# Patient Record
Sex: FEMALE | Race: WHITE | NOT HISPANIC OR LATINO | Employment: OTHER | URBAN - NONMETROPOLITAN AREA
[De-identification: names, ages, dates, MRNs, and addresses within clinical notes are randomized per-mention and may not be internally consistent; named-entity substitution may affect disease eponyms.]

---

## 2023-03-21 PROBLEM — I10 HYPERTENSION: Status: ACTIVE | Noted: 2023-03-21

## 2023-03-21 PROBLEM — E87.6 HYPOKALEMIA: Status: ACTIVE | Noted: 2023-03-21

## 2023-03-21 PROBLEM — G89.29 CHRONIC PAIN: Status: ACTIVE | Noted: 2023-03-21

## 2023-03-21 PROBLEM — N95.2 POSTMENOPAUSAL ATROPHIC VAGINITIS: Status: ACTIVE | Noted: 2023-03-21

## 2023-03-21 PROBLEM — R06.02 CHRONIC SHORTNESS OF BREATH: Status: ACTIVE | Noted: 2023-03-21

## 2023-03-21 PROBLEM — H25.11 AGE-RELATED NUCLEAR CATARACT OF RIGHT EYE: Status: ACTIVE | Noted: 2023-03-21

## 2023-03-21 PROBLEM — M19.90 OSTEOARTHRITIS: Status: ACTIVE | Noted: 2023-03-21

## 2023-03-21 PROBLEM — M81.0 OSTEOPOROSIS: Status: ACTIVE | Noted: 2023-03-21

## 2023-03-21 PROBLEM — E61.1 IRON DEFICIENCY: Status: ACTIVE | Noted: 2023-03-21

## 2023-03-21 PROBLEM — H25.12 AGE-RELATED NUCLEAR CATARACT OF LEFT EYE: Status: ACTIVE | Noted: 2023-03-21

## 2023-03-21 RX ORDER — GUAIFENESIN AND PHENYLEPHRINE HCL 400; 10 MG/1; MG/1
1 TABLET ORAL DAILY
COMMUNITY
Start: 2020-06-19

## 2023-03-21 RX ORDER — BETAMETHASONE VALERATE 1 MG/G
CREAM TOPICAL
COMMUNITY
Start: 2021-10-06 | End: 2023-07-24 | Stop reason: ALTCHOICE

## 2023-03-21 RX ORDER — COD LIVER OIL
1 OIL (ML) ORAL DAILY
COMMUNITY
Start: 2020-06-19 | End: 2023-10-18

## 2023-03-21 RX ORDER — CHOLECALCIFEROL (VITAMIN D3) 25 MCG
1 TABLET ORAL DAILY
COMMUNITY
Start: 2020-06-19 | End: 2024-06-03 | Stop reason: WASHOUT

## 2023-03-21 RX ORDER — NICOTINE POLACRILEX 2 MG
1 GUM BUCCAL DAILY
COMMUNITY
Start: 2020-06-19 | End: 2024-06-03 | Stop reason: WASHOUT

## 2023-03-21 RX ORDER — ASCORBIC ACID 300 MG
1 TABLET,CHEWABLE ORAL DAILY
COMMUNITY
Start: 2020-06-19

## 2023-03-21 RX ORDER — POTASSIUM CHLORIDE 20 MEQ/1
1 TABLET, EXTENDED RELEASE ORAL
COMMUNITY
Start: 2020-02-19

## 2023-03-21 RX ORDER — GINGER ROOT/GINGER ROOT EXT 262.5 MG
1 CAPSULE ORAL DAILY
COMMUNITY
Start: 2020-06-19

## 2023-03-21 RX ORDER — TRIAMCINOLONE ACETONIDE 1 MG/ML
LOTION TOPICAL
COMMUNITY
Start: 2021-05-03 | End: 2023-07-24 | Stop reason: ALTCHOICE

## 2023-03-21 RX ORDER — ASPIRIN 325 MG
1 TABLET ORAL DAILY
COMMUNITY

## 2023-03-21 RX ORDER — DENOSUMAB 60 MG/ML
60 INJECTION SUBCUTANEOUS
COMMUNITY
Start: 2021-07-21 | End: 2024-06-03 | Stop reason: SDUPTHER

## 2023-03-21 RX ORDER — BUDESONIDE AND FORMOTEROL FUMARATE DIHYDRATE 160; 4.5 UG/1; UG/1
2 AEROSOL RESPIRATORY (INHALATION) 2 TIMES DAILY
COMMUNITY
Start: 2019-10-07 | End: 2023-07-03

## 2023-03-21 RX ORDER — GLUCOSAMINE/CHONDR SU A SOD 167-133 MG
1 CAPSULE ORAL DAILY
COMMUNITY

## 2023-03-21 RX ORDER — FUROSEMIDE 40 MG/1
1 TABLET ORAL
COMMUNITY
Start: 2020-05-27

## 2023-03-21 RX ORDER — MULTIVITAMIN
TABLET ORAL
COMMUNITY

## 2023-04-03 ENCOUNTER — OFFICE VISIT (OUTPATIENT)
Dept: PRIMARY CARE | Facility: CLINIC | Age: 88
End: 2023-04-03
Payer: MEDICARE

## 2023-04-03 VITALS
DIASTOLIC BLOOD PRESSURE: 70 MMHG | BODY MASS INDEX: 27.76 KG/M2 | TEMPERATURE: 98 F | OXYGEN SATURATION: 98 % | WEIGHT: 128.3 LBS | SYSTOLIC BLOOD PRESSURE: 118 MMHG | HEART RATE: 53 BPM

## 2023-04-03 DIAGNOSIS — M25.511 ACUTE PAIN OF RIGHT SHOULDER: ICD-10-CM

## 2023-04-03 DIAGNOSIS — R06.02 CHRONIC SHORTNESS OF BREATH: Primary | ICD-10-CM

## 2023-04-03 DIAGNOSIS — M79.18 MUSCULOSKELETAL PAIN: ICD-10-CM

## 2023-04-03 PROCEDURE — 3074F SYST BP LT 130 MM HG: CPT

## 2023-04-03 PROCEDURE — 3078F DIAST BP <80 MM HG: CPT

## 2023-04-03 PROCEDURE — 99214 OFFICE O/P EST MOD 30 MIN: CPT

## 2023-04-03 PROCEDURE — 1036F TOBACCO NON-USER: CPT

## 2023-04-03 RX ORDER — DICLOFENAC SODIUM 10 MG/G
4 GEL TOPICAL 4 TIMES DAILY PRN
Qty: 50 G | Refills: 2 | Status: SHIPPED | OUTPATIENT
Start: 2023-04-03

## 2023-04-03 ASSESSMENT — ENCOUNTER SYMPTOMS
ALLERGIC/IMMUNOLOGIC NEGATIVE: 1
DEPRESSION: 0
FATIGUE: 0
WEAKNESS: 0
OCCASIONAL FEELINGS OF UNSTEADINESS: 0
MUSCULOSKELETAL NEGATIVE: 1
LOSS OF SENSATION IN FEET: 0
SHORTNESS OF BREATH: 0
HEADACHES: 0
FEVER: 0
CARDIOVASCULAR NEGATIVE: 1
PSYCHIATRIC NEGATIVE: 1
RESPIRATORY NEGATIVE: 1
UNEXPECTED WEIGHT CHANGE: 0
DIZZINESS: 0
ENDOCRINE NEGATIVE: 1
ACTIVITY CHANGE: 0
ABDOMINAL PAIN: 0
GASTROINTESTINAL NEGATIVE: 1

## 2023-04-03 ASSESSMENT — SOCIAL DETERMINANTS OF HEALTH (SDOH)
WITHIN THE LAST YEAR, HAVE YOU BEEN KICKED, HIT, SLAPPED, OR OTHERWISE PHYSICALLY HURT BY YOUR PARTNER OR EX-PARTNER?: NO
WITHIN THE LAST YEAR, HAVE YOU BEEN AFRAID OF YOUR PARTNER OR EX-PARTNER?: NO
WITHIN THE LAST YEAR, HAVE TO BEEN RAPED OR FORCED TO HAVE ANY KIND OF SEXUAL ACTIVITY BY YOUR PARTNER OR EX-PARTNER?: NO
WITHIN THE LAST YEAR, HAVE YOU BEEN HUMILIATED OR EMOTIONALLY ABUSED IN OTHER WAYS BY YOUR PARTNER OR EX-PARTNER?: NO

## 2023-04-03 ASSESSMENT — PATIENT HEALTH QUESTIONNAIRE - PHQ9
1. LITTLE INTEREST OR PLEASURE IN DOING THINGS: NOT AT ALL
2. FEELING DOWN, DEPRESSED OR HOPELESS: NOT AT ALL
SUM OF ALL RESPONSES TO PHQ9 QUESTIONS 1 AND 2: 0

## 2023-04-03 NOTE — PATIENT INSTRUCTIONS
Start O2 at night 1-2 liters  Continue healthy diet and active lifestyle    Thank you for coming in today, if any questions or concerns arise, please call my office.     Alex Porras, APRN-CNP

## 2023-04-03 NOTE — PROGRESS NOTES
Subjective   Patient ID: Marilin Khan is a 89 y.o. female who presents for Shoulder Pain (Marilin is here for having a sore right shoulder. ) and oxygen (Would like to discuss getting oxygen at night. States he doesn't breathe very well when she is laying down. ).  Patient presents with complaints of trouble breathing while asleep,   Trouble with taking deep breaths at night.   Usually wakes up mid-sleep, 9p-7am  Naps once per day in the afternoon    Recently fell out of bed about 5 years ago.   Sleeping on the trendle bed.     She has been on Oxygen in the past, had pneumonia, oxygen was used for a number of months during the night, which helped her sleep.   Leg cramps wake her up at night    Right shoulder pain, recent over the past few days  No injury to this  Pain with active and passive ROM    Goes to the [facility] for swimming exercises  Desaturated to 88% with walking on Room air        Vitals:    04/03/23 1434   BP: 118/70   Pulse: 53   Temp: 36.7 °C (98 °F)   SpO2: 98%       Review of Systems   Constitutional:  Negative for activity change, fatigue, fever and unexpected weight change.   HENT: Negative.     Respiratory: Negative.  Negative for shortness of breath.    Cardiovascular: Negative.  Negative for chest pain.   Gastrointestinal: Negative.  Negative for abdominal pain.   Endocrine: Negative.    Musculoskeletal: Negative.    Skin: Negative.    Allergic/Immunologic: Negative.    Neurological:  Negative for dizziness, weakness and headaches.   Psychiatric/Behavioral: Negative.         Objective   Physical Exam  Constitutional:       Appearance: Normal appearance.   HENT:      Head: Normocephalic.      Mouth/Throat:      Mouth: Mucous membranes are moist.   Cardiovascular:      Rate and Rhythm: Normal rate and regular rhythm.      Pulses: Normal pulses.      Heart sounds: Normal heart sounds. No murmur heard.     No friction rub. No gallop.   Pulmonary:      Effort: Pulmonary effort is normal. No respiratory  distress.      Breath sounds: Normal breath sounds. No wheezing.   Abdominal:      General: Bowel sounds are normal. There is no distension.      Palpations: Abdomen is soft.      Tenderness: There is no abdominal tenderness.   Musculoskeletal:         General: No deformity.      Right shoulder: Tenderness and bony tenderness present. No swelling or deformity. Decreased range of motion. Normal strength. Abnormal pulses: voltaren.      Left shoulder: Normal.        Arms:       Comments: AC joint pain on palpation   Skin:     General: Skin is warm and dry.      Capillary Refill: Capillary refill takes less than 2 seconds.   Neurological:      General: No focal deficit present.      Mental Status: She is alert and oriented to person, place, and time.   Psychiatric:         Mood and Affect: Mood normal.         Assessment/Plan   Problem List Items Addressed This Visit          Respiratory    Chronic shortness of breath - Primary     Other Visit Diagnoses       Musculoskeletal pain        Acute pain of right shoulder                Time Spent  Prep time on day of patient encounter: 10 minutes  Time spent directly with patient, family or caregiver: 25 minutes  Additional Time Spent on Patient Care Activities: 5 minutes  Documentation Time: 5 minutes  Other Time Spent: 0 minutes  Total: 45 minutes        Thank you for coming in today, please call my office if you have any concerns or questions.     Alex RADFORD, CNP

## 2023-04-05 ENCOUNTER — TELEPHONE (OUTPATIENT)
Dept: PRIMARY CARE | Facility: CLINIC | Age: 88
End: 2023-04-05
Payer: MEDICARE

## 2023-04-05 NOTE — TELEPHONE ENCOUNTER
Davis from [facility] [employer] called stating that they received the request for home oxygen for patient but are unable to process through her insurance because she needs to have an overnight oximetry test done. They state that they would be able to set her up for this test if you are able to order.

## 2023-04-06 NOTE — TELEPHONE ENCOUNTER
Completed form for [employer] to get overnight oximetry ordered. Faxed form to [facility].

## 2023-06-14 ENCOUNTER — OFFICE VISIT (OUTPATIENT)
Dept: PRIMARY CARE | Facility: CLINIC | Age: 88
End: 2023-06-14
Payer: MEDICARE

## 2023-06-14 VITALS
OXYGEN SATURATION: 91 % | HEART RATE: 94 BPM | SYSTOLIC BLOOD PRESSURE: 120 MMHG | BODY MASS INDEX: 26.66 KG/M2 | TEMPERATURE: 97 F | DIASTOLIC BLOOD PRESSURE: 70 MMHG | WEIGHT: 123.2 LBS

## 2023-06-14 DIAGNOSIS — R09.02 HYPOXIA: ICD-10-CM

## 2023-06-14 DIAGNOSIS — R06.02 CHRONIC SHORTNESS OF BREATH: Primary | ICD-10-CM

## 2023-06-14 PROCEDURE — 1036F TOBACCO NON-USER: CPT | Performed by: FAMILY MEDICINE

## 2023-06-14 PROCEDURE — 99214 OFFICE O/P EST MOD 30 MIN: CPT | Performed by: FAMILY MEDICINE

## 2023-06-14 PROCEDURE — 1159F MED LIST DOCD IN RCRD: CPT | Performed by: FAMILY MEDICINE

## 2023-06-14 PROCEDURE — 3074F SYST BP LT 130 MM HG: CPT | Performed by: FAMILY MEDICINE

## 2023-06-14 PROCEDURE — 3078F DIAST BP <80 MM HG: CPT | Performed by: FAMILY MEDICINE

## 2023-06-14 ASSESSMENT — ENCOUNTER SYMPTOMS
HEADACHES: 0
NAUSEA: 0
WEAKNESS: 0
ARTHRALGIAS: 0
BLOOD IN STOOL: 0
CONSTIPATION: 0
SLEEP DISTURBANCE: 0
EYE ITCHING: 0
SORE THROAT: 0
FEVER: 0
RHINORRHEA: 0
ACTIVITY CHANGE: 0
MYALGIAS: 0
EYE DISCHARGE: 0
NUMBNESS: 0
UNEXPECTED WEIGHT CHANGE: 0
ABDOMINAL PAIN: 0
VOMITING: 0
DYSURIA: 0
SINUS PRESSURE: 0
APPETITE CHANGE: 0
NERVOUS/ANXIOUS: 0
FLANK PAIN: 0
COUGH: 1
DIZZINESS: 0
HEMATURIA: 0
DIARRHEA: 0
PALPITATIONS: 0
LIGHT-HEADEDNESS: 0
DYSPHORIC MOOD: 0
SHORTNESS OF BREATH: 1
JOINT SWELLING: 0
WHEEZING: 0

## 2023-06-14 NOTE — PROGRESS NOTES
Subjective   Patient ID: Marilin Khan is a 89 y.o. female who presents for FACE TO FACE (Marilin is here for oxygen mainly at night. During the day if he walks a lot it takes her awhile to catch her breath. Walking down the hallway her oxygen started at 90 and went to 91. ).    HPI SHE IS HYPOXIC WITH EXERTION HERE AND REPORTS DYSPNEA AT NIGHT   She did this at one point and then refused and they took it back     Review of Systems   Constitutional:  Negative for activity change, appetite change, fever and unexpected weight change.   HENT:  Negative for congestion, ear pain, postnasal drip, rhinorrhea, sinus pressure and sore throat.    Eyes:  Negative for discharge, itching and visual disturbance.   Respiratory:  Positive for cough and shortness of breath. Negative for wheezing.    Cardiovascular:  Negative for chest pain, palpitations and leg swelling.   Gastrointestinal:  Negative for abdominal pain, blood in stool, constipation, diarrhea, nausea and vomiting.   Endocrine: Negative for cold intolerance, heat intolerance and polyuria.   Genitourinary:  Negative for dysuria, flank pain and hematuria.   Musculoskeletal:  Negative for arthralgias, gait problem, joint swelling and myalgias.   Skin:  Negative for rash.   Allergic/Immunologic: Negative for environmental allergies and food allergies.   Neurological:  Negative for dizziness, syncope, weakness, light-headedness, numbness and headaches.   Psychiatric/Behavioral:  Negative for dysphoric mood and sleep disturbance. The patient is not nervous/anxious.        Objective   /70   Pulse 94   Temp 36.1 °C (97 °F)   Wt 55.9 kg (123 lb 3.2 oz)   SpO2 91%   BMI 26.66 kg/m²     Physical Exam  Vitals and nursing note reviewed.   Constitutional:       Appearance: Normal appearance.   HENT:      Head: Normocephalic.      Mouth/Throat:      Mouth: Mucous membranes are moist.   Cardiovascular:      Rate and Rhythm: Normal rate and regular rhythm.      Pulses: Normal  pulses.      Heart sounds: Normal heart sounds. No murmur heard.     No friction rub. No gallop.   Pulmonary:      Effort: Pulmonary effort is normal. No respiratory distress.      Breath sounds: Normal breath sounds. No wheezing.   Abdominal:      General: Bowel sounds are normal. There is no distension.      Palpations: Abdomen is soft.      Tenderness: There is no abdominal tenderness.   Musculoskeletal:         General: No deformity. Normal range of motion.   Skin:     General: Skin is warm and dry.      Capillary Refill: Capillary refill takes less than 2 seconds.   Neurological:      General: No focal deficit present.      Mental Status: She is alert and oriented to person, place, and time.   Psychiatric:         Mood and Affect: Mood normal.         Assessment/Plan   Diagnoses and all orders for this visit:  Chronic shortness of breath  -     Referral to Pulmonology; Future  Hypoxia  -     Referral to Pulmonology; Future

## 2023-07-01 DIAGNOSIS — R06.02 CHRONIC SHORTNESS OF BREATH: Primary | ICD-10-CM

## 2023-07-03 RX ORDER — BUDESONIDE AND FORMOTEROL FUMARATE DIHYDRATE 160; 4.5 UG/1; UG/1
AEROSOL RESPIRATORY (INHALATION)
Qty: 10.2 G | Refills: 2 | Status: SHIPPED | OUTPATIENT
Start: 2023-07-03

## 2023-07-21 ENCOUNTER — APPOINTMENT (OUTPATIENT)
Dept: PRIMARY CARE | Facility: CLINIC | Age: 88
End: 2023-07-21
Payer: MEDICARE

## 2023-07-24 ENCOUNTER — OFFICE VISIT (OUTPATIENT)
Dept: PRIMARY CARE | Facility: CLINIC | Age: 88
End: 2023-07-24
Payer: MEDICARE

## 2023-07-24 VITALS
TEMPERATURE: 96.1 F | BODY MASS INDEX: 26.4 KG/M2 | HEART RATE: 68 BPM | WEIGHT: 122 LBS | DIASTOLIC BLOOD PRESSURE: 82 MMHG | SYSTOLIC BLOOD PRESSURE: 122 MMHG | OXYGEN SATURATION: 96 %

## 2023-07-24 DIAGNOSIS — L21.9 SEBORRHEIC DERMATITIS OF SCALP: Primary | ICD-10-CM

## 2023-07-24 DIAGNOSIS — M25.511 PAIN AND SWELLING OF RIGHT SHOULDER: ICD-10-CM

## 2023-07-24 DIAGNOSIS — M25.411 PAIN AND SWELLING OF RIGHT SHOULDER: ICD-10-CM

## 2023-07-24 PROCEDURE — 1159F MED LIST DOCD IN RCRD: CPT | Performed by: FAMILY MEDICINE

## 2023-07-24 PROCEDURE — 99214 OFFICE O/P EST MOD 30 MIN: CPT | Performed by: FAMILY MEDICINE

## 2023-07-24 PROCEDURE — 3079F DIAST BP 80-89 MM HG: CPT | Performed by: FAMILY MEDICINE

## 2023-07-24 PROCEDURE — 3074F SYST BP LT 130 MM HG: CPT | Performed by: FAMILY MEDICINE

## 2023-07-24 PROCEDURE — 1036F TOBACCO NON-USER: CPT | Performed by: FAMILY MEDICINE

## 2023-07-24 RX ORDER — CYANOCOBALAMIN (VITAMIN B-12) 250 MCG
1 TABLET ORAL DAILY
COMMUNITY

## 2023-07-24 RX ORDER — CALCIUM CARBONATE/VITAMIN D3 500 MG-10
TABLET,CHEWABLE ORAL
COMMUNITY

## 2023-07-24 RX ORDER — FERROUS SULFATE 325(65) MG
TABLET ORAL
COMMUNITY

## 2023-07-24 RX ORDER — AMLODIPINE BESYLATE 2.5 MG/1
TABLET ORAL
COMMUNITY
Start: 2019-03-19 | End: 2023-10-18

## 2023-07-24 RX ORDER — KETOCONAZOLE 20 MG/ML
SHAMPOO, SUSPENSION TOPICAL 2 TIMES WEEKLY
Qty: 120 ML | Refills: 0 | Status: SHIPPED | OUTPATIENT
Start: 2023-07-24 | End: 2023-11-30

## 2023-07-24 RX ORDER — ALBUTEROL SULFATE 90 UG/1
AEROSOL, METERED RESPIRATORY (INHALATION) 4 TIMES DAILY
COMMUNITY
Start: 2019-03-19 | End: 2024-06-03 | Stop reason: SDUPTHER

## 2023-07-24 ASSESSMENT — ENCOUNTER SYMPTOMS
RHINORRHEA: 0
VOMITING: 0
SHORTNESS OF BREATH: 0
NAUSEA: 0
FEVER: 0
HEMATURIA: 0
NUMBNESS: 0
DYSPHORIC MOOD: 0
MYALGIAS: 0
DYSURIA: 0
EYE DISCHARGE: 0
WEAKNESS: 0
SLEEP DISTURBANCE: 0
SORE THROAT: 0
SINUS PRESSURE: 0
FLANK PAIN: 0
APPETITE CHANGE: 0
WHEEZING: 0
PALPITATIONS: 0
JOINT SWELLING: 0
EYE ITCHING: 0
HEADACHES: 0
DIZZINESS: 0
NERVOUS/ANXIOUS: 0
CONSTIPATION: 0
LIGHT-HEADEDNESS: 0
BLOOD IN STOOL: 0
DIARRHEA: 0
UNEXPECTED WEIGHT CHANGE: 0
COUGH: 0
ACTIVITY CHANGE: 0
ARTHRALGIAS: 0
ABDOMINAL PAIN: 0

## 2023-07-24 NOTE — PATIENT INSTRUCTIONS
SENDING FOR XRAYS OF THE SHOULDER   THE OFFICE WILL CALL    PLAN N THIS SHAMPOO CURES THAT BUT IF NOT /ONE MONTH CALL IF NOT RESOLVED

## 2023-07-24 NOTE — PROGRESS NOTES
Subjective   Patient ID: Marilin Khan is a 89 y.o. female who presents for Hair/Scalp Problem (LESIONS ON SCALP >1 YEAR), PROLIA (DUE FOR PROLIA), and Shoulder Pain (R SIDED-NO INJURY- 4-5 MONTHS NO WORSE BUT NO BETTER).    Shoulder Pain   Pertinent negatives include no fever or numbness.    PATIENT ALERT AND WALKS DOWN THE HALLWAY WITH A SMILE ON HER FACE   WEIGHT SAME AND VITALS ARE GOOD     Review of Systems   Constitutional:  Negative for activity change, appetite change, fever and unexpected weight change.   HENT:  Negative for congestion, ear pain, postnasal drip, rhinorrhea, sinus pressure and sore throat.    Eyes:  Negative for discharge, itching and visual disturbance.   Respiratory:  Negative for cough, shortness of breath and wheezing.    Cardiovascular:  Negative for chest pain, palpitations and leg swelling.   Gastrointestinal:  Negative for abdominal pain, blood in stool, constipation, diarrhea, nausea and vomiting.   Endocrine: Negative for cold intolerance, heat intolerance and polyuria.   Genitourinary:  Negative for dysuria, flank pain and hematuria.   Musculoskeletal:  Negative for arthralgias, gait problem, joint swelling and myalgias.   Skin:  Positive for rash.        LESIONS ON HER SCALP    Allergic/Immunologic: Negative for environmental allergies and food allergies.   Neurological:  Negative for dizziness, syncope, weakness, light-headedness, numbness and headaches.   Psychiatric/Behavioral:  Negative for dysphoric mood and sleep disturbance. The patient is not nervous/anxious.        Objective   /82   Pulse 68   Temp 35.6 °C (96.1 °F)   Wt 55.3 kg (122 lb)   SpO2 96%   BMI 26.40 kg/m²     Physical Exam  Constitutional:       Appearance: Normal appearance.   HENT:      Head: Normocephalic and atraumatic.      Nose: Nose normal.      Mouth/Throat:      Mouth: Mucous membranes are moist.   Eyes:      Extraocular Movements: Extraocular movements intact.      Pupils: Pupils are  "equal, round, and reactive to light.   Cardiovascular:      Rate and Rhythm: Normal rate and regular rhythm.   Pulmonary:      Effort: Pulmonary effort is normal.      Breath sounds: Normal breath sounds.   Abdominal:      Palpations: Abdomen is soft.   Musculoskeletal:         General: Tenderness and deformity present. No signs of injury.      Cervical back: Normal range of motion and neck supple.      Comments: RIGHT SHOULDER WITH DECREASED AND PAINFUL ROM AND A \"LUMP\" THAT IS TENDER ON THE RIGHT SCAPULA THAT MAY BE OCCULT FRACTURE    Skin:     General: Skin is warm and dry.      Findings: Lesion and rash present.      Comments: SCALY RASH ON TOP OF SCALP    Neurological:      General: No focal deficit present.      Mental Status: She is alert and oriented to person, place, and time.   Psychiatric:         Mood and Affect: Mood normal.         Behavior: Behavior normal.         Assessment/Plan   Diagnoses and all orders for this visit:  Seborrheic dermatitis of scalp  -     ketoconazole (NIZOral) 2 % shampoo; Apply topically 2 times a week. Shampoo daily, leave on for 5-10 minutes, then rinse.  Pain and swelling of right shoulder  -     XR shoulder right 2+ views; Future  -     XR scapula right; Future         "

## 2023-07-27 ENCOUNTER — TELEPHONE (OUTPATIENT)
Dept: PRIMARY CARE | Facility: CLINIC | Age: 88
End: 2023-07-27
Payer: MEDICARE

## 2023-07-27 NOTE — LETTER
August 1, 2023     Marilin Khan  [address]  [facility] [address]    Patient: Marilin Khan   YOB: 1931   Date of Visit: 7/27/2023       Dear Dr. Marilin Khan:    I have been trying to reach you regarding your results.  Please contact the office at your convenience.       Sincerely,     Melissa Kay MA

## 2023-10-18 ENCOUNTER — OFFICE VISIT (OUTPATIENT)
Dept: PRIMARY CARE | Facility: CLINIC | Age: 88
End: 2023-10-18
Payer: MEDICARE

## 2023-10-18 VITALS
WEIGHT: 125 LBS | OXYGEN SATURATION: 98 % | SYSTOLIC BLOOD PRESSURE: 116 MMHG | TEMPERATURE: 97.5 F | BODY MASS INDEX: 27.05 KG/M2 | HEART RATE: 94 BPM | DIASTOLIC BLOOD PRESSURE: 64 MMHG

## 2023-10-18 DIAGNOSIS — M87.019 AVASCULAR NECROSIS OF BONE OF SHOULDER (MULTI): ICD-10-CM

## 2023-10-18 DIAGNOSIS — I95.1 ORTHOSTATIC HYPOTENSION: Primary | ICD-10-CM

## 2023-10-18 DIAGNOSIS — I10 PRIMARY HYPERTENSION: ICD-10-CM

## 2023-10-18 DIAGNOSIS — M75.01 ADHESIVE CAPSULITIS OF RIGHT SHOULDER: ICD-10-CM

## 2023-10-18 PROCEDURE — 3078F DIAST BP <80 MM HG: CPT | Performed by: FAMILY MEDICINE

## 2023-10-18 PROCEDURE — 1036F TOBACCO NON-USER: CPT | Performed by: FAMILY MEDICINE

## 2023-10-18 PROCEDURE — 80053 COMPREHEN METABOLIC PANEL: CPT

## 2023-10-18 PROCEDURE — 3074F SYST BP LT 130 MM HG: CPT | Performed by: FAMILY MEDICINE

## 2023-10-18 PROCEDURE — 1159F MED LIST DOCD IN RCRD: CPT | Performed by: FAMILY MEDICINE

## 2023-10-18 PROCEDURE — 36415 COLL VENOUS BLD VENIPUNCTURE: CPT

## 2023-10-18 PROCEDURE — 99214 OFFICE O/P EST MOD 30 MIN: CPT | Performed by: FAMILY MEDICINE

## 2023-10-18 PROCEDURE — 85025 COMPLETE CBC W/AUTO DIFF WBC: CPT

## 2023-10-18 PROCEDURE — 85060 BLOOD SMEAR INTERPRETATION: CPT | Performed by: FAMILY MEDICINE

## 2023-10-18 RX ORDER — AMLODIPINE BESYLATE 2.5 MG/1
2.5 TABLET ORAL DAILY
Qty: 90 TABLET | Refills: 3 | Status: SHIPPED | OUTPATIENT
Start: 2023-10-18

## 2023-10-18 RX ORDER — AMLODIPINE BESYLATE 5 MG/1
TABLET ORAL
COMMUNITY
Start: 2019-03-19 | End: 2023-10-18 | Stop reason: SINTOL

## 2023-10-18 ASSESSMENT — ENCOUNTER SYMPTOMS
COUGH: 0
NUMBNESS: 0
ACTIVITY CHANGE: 0
JOINT SWELLING: 0
SLEEP DISTURBANCE: 0
DYSPHORIC MOOD: 0
UNEXPECTED WEIGHT CHANGE: 0
ARTHRALGIAS: 1
NERVOUS/ANXIOUS: 0
ABDOMINAL PAIN: 0
EYE ITCHING: 0
WHEEZING: 0
LIGHT-HEADEDNESS: 0
SINUS PRESSURE: 0
FLANK PAIN: 0
DIZZINESS: 1
HEMATURIA: 0
MYALGIAS: 0
FEVER: 0
PALPITATIONS: 0
BLOOD IN STOOL: 0
SHORTNESS OF BREATH: 0
DIARRHEA: 0
VOMITING: 0
RHINORRHEA: 0
DYSURIA: 0
EYE DISCHARGE: 0
APPETITE CHANGE: 0
CONSTIPATION: 0
HEADACHES: 0
NAUSEA: 0
SORE THROAT: 0
WEAKNESS: 0

## 2023-10-18 NOTE — PROGRESS NOTES
Subjective   Patient ID: Marilin Khan is a 89 y.o. female who presents for Dizziness (X2 weeks.  Specifically when standing up and sitting up from a laying position.) and Shoulder Pain (Ongoing for quite some time.  Xrays completed in July.).    HPI SHE HAS FROZEN SHOULDER DUE TO AVASCULAR NECROSIS HUMERAL HEAD   CHANGE IN HER B/P MED IT WAS INCREASED AND NOW ORTHOSTATIC HYPOTENSION     Review of Systems   Constitutional:  Negative for activity change, appetite change, fever and unexpected weight change.   HENT:  Negative for congestion, ear pain, postnasal drip, rhinorrhea, sinus pressure and sore throat.    Eyes:  Negative for discharge, itching and visual disturbance.   Respiratory:  Negative for cough, shortness of breath and wheezing.    Cardiovascular:  Negative for chest pain, palpitations and leg swelling.   Gastrointestinal:  Negative for abdominal pain, blood in stool, constipation, diarrhea, nausea and vomiting.   Endocrine: Negative for cold intolerance, heat intolerance and polyuria.   Genitourinary:  Negative for dysuria, flank pain and hematuria.   Musculoskeletal:  Positive for arthralgias. Negative for gait problem, joint swelling and myalgias.   Skin:  Negative for rash.   Allergic/Immunologic: Negative for environmental allergies and food allergies.   Neurological:  Positive for dizziness. Negative for syncope, weakness, light-headedness, numbness and headaches.   Psychiatric/Behavioral:  Negative for dysphoric mood and sleep disturbance. The patient is not nervous/anxious.        Objective   /64   Pulse 94   Temp 36.4 °C (97.5 °F)   Wt 56.7 kg (125 lb)   SpO2 98%   BMI 27.05 kg/m²     Physical Exam  Vitals and nursing note reviewed.   Constitutional:       Appearance: Normal appearance.   HENT:      Head: Normocephalic.      Mouth/Throat:      Mouth: Mucous membranes are moist.   Cardiovascular:      Rate and Rhythm: Normal rate and regular rhythm.      Pulses: Normal pulses.       Heart sounds: Normal heart sounds. No murmur heard.     No friction rub. No gallop.   Pulmonary:      Effort: Pulmonary effort is normal. No respiratory distress.      Breath sounds: Normal breath sounds. No wheezing.   Abdominal:      General: Bowel sounds are normal. There is no distension.      Palpations: Abdomen is soft.      Tenderness: There is no abdominal tenderness.   Musculoskeletal:         General: Swelling, tenderness and deformity present. No signs of injury.      Comments: DENIES FALLING BUT NOW X-RAY SHOWS AVASCULAR NECROSIS HUMERAL HEAD ON THE RIGHT AND DECREASED PAINFUL ROM    IS OKAY    Skin:     General: Skin is warm and dry.      Capillary Refill: Capillary refill takes less than 2 seconds.   Neurological:      General: No focal deficit present.      Mental Status: She is alert and oriented to person, place, and time.   Psychiatric:         Mood and Affect: Mood normal.         Assessment/Plan   Diagnoses and all orders for this visit:  Orthostatic hypotension  -     amLODIPine (Norvasc) 2.5 mg tablet; Take 1 tablet (2.5 mg) by mouth once daily.  -     CBC and Auto Differential  -     Comprehensive Metabolic Panel  Primary hypertension  -     amLODIPine (Norvasc) 2.5 mg tablet; Take 1 tablet (2.5 mg) by mouth once daily.  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

## 2023-10-19 LAB
ALBUMIN SERPL BCP-MCNC: 4.1 G/DL (ref 3.4–5)
ALP SERPL-CCNC: 44 U/L (ref 33–136)
ALT SERPL W P-5'-P-CCNC: 16 U/L (ref 7–45)
ANION GAP SERPL CALC-SCNC: 13 MMOL/L (ref 10–20)
AST SERPL W P-5'-P-CCNC: 20 U/L (ref 9–39)
BASOPHILS # BLD AUTO: 0.06 X10*3/UL (ref 0–0.1)
BASOPHILS NFR BLD AUTO: 0.3 %
BILIRUB SERPL-MCNC: 0.7 MG/DL (ref 0–1.2)
BUN SERPL-MCNC: 12 MG/DL (ref 6–23)
CALCIUM SERPL-MCNC: 9.3 MG/DL (ref 8.6–10.6)
CHLORIDE SERPL-SCNC: 101 MMOL/L (ref 98–107)
CO2 SERPL-SCNC: 28 MMOL/L (ref 21–32)
CREAT SERPL-MCNC: 0.58 MG/DL (ref 0.5–1.05)
EOSINOPHIL # BLD AUTO: 0.05 X10*3/UL (ref 0–0.4)
EOSINOPHIL NFR BLD AUTO: 0.3 %
ERYTHROCYTE [DISTWIDTH] IN BLOOD BY AUTOMATED COUNT: 12.6 % (ref 11.5–14.5)
GFR SERPL CREATININE-BSD FRML MDRD: 85 ML/MIN/1.73M*2
GLUCOSE SERPL-MCNC: 80 MG/DL (ref 74–99)
HCT VFR BLD AUTO: 42.8 % (ref 36–46)
HGB BLD-MCNC: 13.6 G/DL (ref 12–16)
IMM GRANULOCYTES # BLD AUTO: 0.03 X10*3/UL (ref 0–0.5)
IMM GRANULOCYTES NFR BLD AUTO: 0.2 % (ref 0–0.9)
LYMPHOCYTES # BLD AUTO: 11.39 X10*3/UL (ref 0.8–3)
LYMPHOCYTES NFR BLD AUTO: 64.9 %
MCH RBC QN AUTO: 32.6 PG (ref 26–34)
MCHC RBC AUTO-ENTMCNC: 31.8 G/DL (ref 32–36)
MCV RBC AUTO: 103 FL (ref 80–100)
MONOCYTES # BLD AUTO: 2.28 X10*3/UL (ref 0.05–0.8)
MONOCYTES NFR BLD AUTO: 13 %
NEUTROPHILS # BLD AUTO: 3.73 X10*3/UL (ref 1.6–5.5)
NEUTROPHILS NFR BLD AUTO: 21.3 %
NRBC BLD-RTO: 0 /100 WBCS (ref 0–0)
PATH REVIEW-CBC DIFFERENTIAL: NORMAL
PLATELET # BLD AUTO: 227 X10*3/UL (ref 150–450)
PMV BLD AUTO: 9.6 FL (ref 7.5–11.5)
POTASSIUM SERPL-SCNC: 4.4 MMOL/L (ref 3.5–5.3)
PROT SERPL-MCNC: 6.6 G/DL (ref 6.4–8.2)
RBC # BLD AUTO: 4.17 X10*6/UL (ref 4–5.2)
SODIUM SERPL-SCNC: 138 MMOL/L (ref 136–145)
WBC # BLD AUTO: 17.5 X10*3/UL (ref 4.4–11.3)

## 2023-10-20 ENCOUNTER — TELEPHONE (OUTPATIENT)
Dept: PRIMARY CARE | Facility: CLINIC | Age: 88
End: 2023-10-20
Payer: MEDICARE

## 2023-10-20 DIAGNOSIS — D72.820 LYMPHOCYTOSIS: Primary | ICD-10-CM

## 2023-10-20 NOTE — PROGRESS NOTES
ABNORMAL WBC HIGHER THAN BEFORE /ALSO RECENT BONE CHANGE WITH REPORT OF AVASCULAR NECROSIS   REFER TO DR MURRAY IN [place]

## 2023-10-20 NOTE — LETTER
November 16, 2023     Marilin Khan  [address] [place] [address]    Patient: Marilin Khan   YOB: 1931   Date of Visit: 10/20/2023       Dear  Marilin Khan:    I have been trying to contact you regarding your results.  Please contact the office at your earliest convenience.  Sincerely,     Melissa Kay MA      CC: No Recipients  ______________________________________________________________________________________

## 2023-10-20 NOTE — TELEPHONE ENCOUNTER
"----- Message from Estefania Black DO sent at 10/20/2023  2:15 PM EDT -----  SHE HAS AN ABNORMAL CBC WITH INCREASED AND ABNORMAL WBC\"S   NEEDS TO SEE HEMATOLOGY DR MURRAY   "

## 2023-11-08 ENCOUNTER — APPOINTMENT (OUTPATIENT)
Dept: RADIOLOGY | Facility: HOSPITAL | Age: 88
End: 2023-11-08
Payer: MEDICARE

## 2023-11-08 ENCOUNTER — HOSPITAL ENCOUNTER (EMERGENCY)
Facility: HOSPITAL | Age: 88
Discharge: HOME | End: 2023-11-08
Attending: EMERGENCY MEDICINE
Payer: MEDICARE

## 2023-11-08 VITALS
TEMPERATURE: 97.5 F | DIASTOLIC BLOOD PRESSURE: 79 MMHG | HEART RATE: 84 BPM | RESPIRATION RATE: 18 BRPM | WEIGHT: 123 LBS | HEIGHT: 57 IN | OXYGEN SATURATION: 96 % | BODY MASS INDEX: 26.54 KG/M2 | SYSTOLIC BLOOD PRESSURE: 136 MMHG

## 2023-11-08 DIAGNOSIS — R53.83 OTHER FATIGUE: ICD-10-CM

## 2023-11-08 DIAGNOSIS — R53.1 GENERALIZED WEAKNESS: Primary | ICD-10-CM

## 2023-11-08 LAB
ALBUMIN SERPL BCP-MCNC: 3.8 G/DL (ref 3.4–5)
ALP SERPL-CCNC: 37 U/L (ref 33–136)
ALT SERPL W P-5'-P-CCNC: 14 U/L (ref 7–45)
ANION GAP SERPL CALC-SCNC: 14 MMOL/L (ref 10–20)
APPEARANCE UR: CLEAR
AST SERPL W P-5'-P-CCNC: 20 U/L (ref 9–39)
BASOPHILS # BLD AUTO: 0.03 X10*3/UL (ref 0–0.1)
BASOPHILS NFR BLD AUTO: 0.2 %
BILIRUB SERPL-MCNC: 0.5 MG/DL (ref 0–1.2)
BILIRUB UR STRIP.AUTO-MCNC: NEGATIVE MG/DL
BNP SERPL-MCNC: 90 PG/ML (ref 0–99)
BUN SERPL-MCNC: 16 MG/DL (ref 6–23)
CALCIUM SERPL-MCNC: 9.2 MG/DL (ref 8.6–10.3)
CARDIAC TROPONIN I PNL SERPL HS: 5 NG/L (ref 0–13)
CHLORIDE SERPL-SCNC: 100 MMOL/L (ref 98–107)
CO2 SERPL-SCNC: 28 MMOL/L (ref 21–32)
COLOR UR: NORMAL
CREAT SERPL-MCNC: 0.68 MG/DL (ref 0.5–1.05)
EOSINOPHIL # BLD AUTO: 0.06 X10*3/UL (ref 0–0.4)
EOSINOPHIL NFR BLD AUTO: 0.3 %
ERYTHROCYTE [DISTWIDTH] IN BLOOD BY AUTOMATED COUNT: 12.6 % (ref 11.5–14.5)
FLUAV RNA RESP QL NAA+PROBE: NOT DETECTED
FLUBV RNA RESP QL NAA+PROBE: NOT DETECTED
GFR SERPL CREATININE-BSD FRML MDRD: 82 ML/MIN/1.73M*2
GLUCOSE SERPL-MCNC: 93 MG/DL (ref 74–99)
GLUCOSE UR STRIP.AUTO-MCNC: NEGATIVE MG/DL
HCT VFR BLD AUTO: 40.5 % (ref 36–46)
HGB BLD-MCNC: 13.6 G/DL (ref 12–16)
HOLD SPECIMEN: NORMAL
IMM GRANULOCYTES # BLD AUTO: 0.04 X10*3/UL (ref 0–0.5)
IMM GRANULOCYTES NFR BLD AUTO: 0.2 % (ref 0–0.9)
KETONES UR STRIP.AUTO-MCNC: NEGATIVE MG/DL
LACTATE SERPL-SCNC: 1.3 MMOL/L (ref 0.4–2)
LEUKOCYTE ESTERASE UR QL STRIP.AUTO: NEGATIVE
LYMPHOCYTES # BLD AUTO: 11.21 X10*3/UL (ref 0.8–3)
LYMPHOCYTES NFR BLD AUTO: 63.9 %
MAGNESIUM SERPL-MCNC: 1.95 MG/DL (ref 1.6–2.4)
MCH RBC QN AUTO: 33.4 PG (ref 26–34)
MCHC RBC AUTO-ENTMCNC: 33.6 G/DL (ref 32–36)
MCV RBC AUTO: 100 FL (ref 80–100)
MONOCYTES # BLD AUTO: 2.13 X10*3/UL (ref 0.05–0.8)
MONOCYTES NFR BLD AUTO: 12.2 %
NEUTROPHILS # BLD AUTO: 4.06 X10*3/UL (ref 1.6–5.5)
NEUTROPHILS NFR BLD AUTO: 23.2 %
NITRITE UR QL STRIP.AUTO: NEGATIVE
NRBC BLD-RTO: 0 /100 WBCS (ref 0–0)
PH UR STRIP.AUTO: 6 [PH]
PLATELET # BLD AUTO: 192 X10*3/UL (ref 150–450)
POLYCHROMASIA BLD QL SMEAR: NORMAL
POTASSIUM SERPL-SCNC: 4.3 MMOL/L (ref 3.5–5.3)
PROT SERPL-MCNC: 6.5 G/DL (ref 6.4–8.2)
PROT UR STRIP.AUTO-MCNC: NEGATIVE MG/DL
RBC # BLD AUTO: 4.07 X10*6/UL (ref 4–5.2)
RBC # UR STRIP.AUTO: NEGATIVE /UL
RBC MORPH BLD: NORMAL
RSV RNA RESP QL NAA+PROBE: NOT DETECTED
SARS-COV-2 RNA RESP QL NAA+PROBE: NOT DETECTED
SCHISTOCYTES BLD QL SMEAR: NORMAL
SODIUM SERPL-SCNC: 138 MMOL/L (ref 136–145)
SP GR UR STRIP.AUTO: 1.01
UROBILINOGEN UR STRIP.AUTO-MCNC: <2 MG/DL
WBC # BLD AUTO: 17.5 X10*3/UL (ref 4.4–11.3)

## 2023-11-08 PROCEDURE — 83735 ASSAY OF MAGNESIUM: CPT | Performed by: EMERGENCY MEDICINE

## 2023-11-08 PROCEDURE — 71250 CT THORAX DX C-: CPT

## 2023-11-08 PROCEDURE — 85025 COMPLETE CBC W/AUTO DIFF WBC: CPT | Performed by: EMERGENCY MEDICINE

## 2023-11-08 PROCEDURE — 99285 EMERGENCY DEPT VISIT HI MDM: CPT | Mod: 25 | Performed by: EMERGENCY MEDICINE

## 2023-11-08 PROCEDURE — 81003 URINALYSIS AUTO W/O SCOPE: CPT | Performed by: EMERGENCY MEDICINE

## 2023-11-08 PROCEDURE — 83880 ASSAY OF NATRIURETIC PEPTIDE: CPT | Performed by: EMERGENCY MEDICINE

## 2023-11-08 PROCEDURE — 80053 COMPREHEN METABOLIC PANEL: CPT | Performed by: EMERGENCY MEDICINE

## 2023-11-08 PROCEDURE — 70450 CT HEAD/BRAIN W/O DYE: CPT | Performed by: RADIOLOGY

## 2023-11-08 PROCEDURE — 83605 ASSAY OF LACTIC ACID: CPT | Performed by: EMERGENCY MEDICINE

## 2023-11-08 PROCEDURE — 71250 CT THORAX DX C-: CPT | Performed by: RADIOLOGY

## 2023-11-08 PROCEDURE — 87637 SARSCOV2&INF A&B&RSV AMP PRB: CPT | Performed by: EMERGENCY MEDICINE

## 2023-11-08 PROCEDURE — 70450 CT HEAD/BRAIN W/O DYE: CPT

## 2023-11-08 PROCEDURE — 36415 COLL VENOUS BLD VENIPUNCTURE: CPT | Performed by: EMERGENCY MEDICINE

## 2023-11-08 PROCEDURE — 84484 ASSAY OF TROPONIN QUANT: CPT | Performed by: EMERGENCY MEDICINE

## 2023-11-08 ASSESSMENT — PAIN - FUNCTIONAL ASSESSMENT: PAIN_FUNCTIONAL_ASSESSMENT: 0-10

## 2023-11-08 ASSESSMENT — COLUMBIA-SUICIDE SEVERITY RATING SCALE - C-SSRS
2. HAVE YOU ACTUALLY HAD ANY THOUGHTS OF KILLING YOURSELF?: NO
1. IN THE PAST MONTH, HAVE YOU WISHED YOU WERE DEAD OR WISHED YOU COULD GO TO SLEEP AND NOT WAKE UP?: NO
6. HAVE YOU EVER DONE ANYTHING, STARTED TO DO ANYTHING, OR PREPARED TO DO ANYTHING TO END YOUR LIFE?: NO

## 2023-11-08 ASSESSMENT — PAIN SCALES - GENERAL: PAINLEVEL_OUTOF10: 0 - NO PAIN

## 2023-11-08 NOTE — ED PROVIDER NOTES
"HPI   Chief Complaint   Patient presents with    Weakness, Gen     Pt presents with c/o generally feeling ill for the past few months, denies any acute changes, states she wears oxygen at night but denies any specific reason for wearing it other that it makes her feel better       HPI  Patient is a 89-year-old female presenting to the ED today for generalized weakness over the past 6 months.  Patient explains that she has just been feeling \"tired\" with decreased energy over the past 6 months.  She denies any acute changes in the past several days, but just felt like she needed to be checked out.  She otherwise denies any chest pain, cough, shortness of breath, abdominal pain, vomiting, or diarrhea.  She denies any recent fever or urinary symptoms.  She has no other complaints at this time.  She did see her PCP about 1 month ago, but niece at the bedside states that she did not have any outpatient blood work or imaging ordered after this visit.                  [place] Coma Scale Score: 15                  Patient History   Past Medical History:   Diagnosis Date    Hypertension     Unspecified cataract     Cataract, bilateral     Past Surgical History:   Procedure Laterality Date    FOOT SURGERY  09/06/2017    Foot Surgery Right    MANDIBLE SURGERY  09/06/2017    Jaw Surgery    SKIN BIOPSY      FACE AND SCALP-BENIGN    TONSILLECTOMY       Family History   Problem Relation Name Age of Onset    Macular degeneration Mother      Diabetes Brother      Diabetes Maternal Grandmother       Social History     Tobacco Use    Smoking status: Never    Smokeless tobacco: Never   Vaping Use    Vaping Use: Never used   Substance Use Topics    Alcohol use: Yes     Comment: rare    Drug use: Never       Physical Exam   ED Triage Vitals [11/08/23 1345]   Temp Heart Rate Resp BP   36.4 °C (97.5 °F) 86 18 147/86      SpO2 Temp src Heart Rate Source Patient Position   95 % -- -- --      BP Location FiO2 (%)     -- --       Physical " Exam  Vitals and nursing note reviewed.   Constitutional:       General: She is not in acute distress.     Appearance: She is not toxic-appearing.   HENT:      Head: Normocephalic.      Mouth/Throat:      Mouth: Mucous membranes are moist.   Eyes:      Extraocular Movements: Extraocular movements intact.      Conjunctiva/sclera: Conjunctivae normal.   Cardiovascular:      Rate and Rhythm: Normal rate and regular rhythm.      Pulses: Normal pulses.   Pulmonary:      Effort: Pulmonary effort is normal. No respiratory distress.      Breath sounds: Normal breath sounds. No wheezing.   Abdominal:      General: There is no distension.      Palpations: Abdomen is soft.      Tenderness: There is no abdominal tenderness.   Musculoskeletal:         General: No swelling.      Cervical back: Neck supple.   Skin:     General: Skin is warm and dry.      Capillary Refill: Capillary refill takes less than 2 seconds.   Neurological:      General: No focal deficit present.      Mental Status: She is alert. Mental status is at baseline.         ED Course & MDM   Diagnoses as of 11/08/23 1639   Generalized weakness   Other fatigue       Medical Decision Making  Patient was seen and evaluated for several months of generalized weakness.  Differential diagnosis includes but is not limited to ACS, PE, AAA, Viral Infection, COPD, CHF, sepsis, dehydration, electrolyte abnormality.  Initial EKG does not show any acute ischemic changes.  Patient is placed on a cardiac monitor with continuous pulse ox.  Additional labs and imaging are ordered for further evaluation of the patient's symptoms.  CBC shows leukocytosis with a WBC of 17.5.  Patient does appear to have a chronic leukocytosis, with WBC of 17.5 from approximately 3 weeks ago as well.  CMP is unremarkable.  High-sensitivity troponin is normal at 5.  Magnesium is normal at 1.9.  BNP is normal at 90.  Lactic acid is normal at 1.3.  COVID-19 and influenza swabs are negative.  Urinalysis is  not indicative of infection.  CT chest wo IV contrast   Final Result   1.  No acute finding in the chest on this unenhanced study.   2. Chronic nonemergent findings as above        MACRO:   None        Signed by: Juan Carlos Weston 11/8/2023 4:06 PM   Dictation workstation:   [id number]      CT head wo IV contrast   Final Result   No evidence of acute cortical infarct or intracranial hemorrhage.        No evidence of intracranial hemorrhage or displaced skull fracture.        Signed by: Juan Carlos Weston 11/8/2023 3:27 PM   Dictation workstation:   [id number]      On reevaluation, patient is resting comfortably in bed, in no acute distress.  Patient was informed of their lab and imaging results, and all questions and concerns were answered. Discharge planning with close outpatient follow-up was discussed at this time, to which the patient was agreeable. Strict return precautions were given, and patient was discharged home in stable condition.    Procedure  Procedures  EKG obtained at 1436, interpreted by myself.  Normal sinus rhythm with a ventricular rate of 76, premature atrial complexes present, otherwise normal intervals, with no acute ischemic changes.     Taylor PRO MD  11/08/23 1888

## 2023-11-22 ENCOUNTER — APPOINTMENT (OUTPATIENT)
Dept: PRIMARY CARE | Facility: CLINIC | Age: 88
End: 2023-11-22
Payer: MEDICARE

## 2023-11-27 ENCOUNTER — OFFICE VISIT (OUTPATIENT)
Dept: PRIMARY CARE | Facility: CLINIC | Age: 88
End: 2023-11-27
Payer: MEDICARE

## 2023-11-27 VITALS
SYSTOLIC BLOOD PRESSURE: 102 MMHG | OXYGEN SATURATION: 98 % | BODY MASS INDEX: 27.48 KG/M2 | DIASTOLIC BLOOD PRESSURE: 66 MMHG | TEMPERATURE: 97 F | WEIGHT: 127 LBS | HEART RATE: 91 BPM

## 2023-11-27 DIAGNOSIS — M19.011 PRIMARY OSTEOARTHRITIS OF RIGHT SHOULDER: ICD-10-CM

## 2023-11-27 DIAGNOSIS — I10 PRIMARY HYPERTENSION: Primary | ICD-10-CM

## 2023-11-27 DIAGNOSIS — L21.9 SEBORRHEIC DERMATITIS OF SCALP: ICD-10-CM

## 2023-11-27 PROCEDURE — 1159F MED LIST DOCD IN RCRD: CPT | Performed by: FAMILY MEDICINE

## 2023-11-27 PROCEDURE — 99214 OFFICE O/P EST MOD 30 MIN: CPT | Performed by: FAMILY MEDICINE

## 2023-11-27 PROCEDURE — 1036F TOBACCO NON-USER: CPT | Performed by: FAMILY MEDICINE

## 2023-11-27 PROCEDURE — 1126F AMNT PAIN NOTED NONE PRSNT: CPT | Performed by: FAMILY MEDICINE

## 2023-11-27 PROCEDURE — 3074F SYST BP LT 130 MM HG: CPT | Performed by: FAMILY MEDICINE

## 2023-11-27 PROCEDURE — 3078F DIAST BP <80 MM HG: CPT | Performed by: FAMILY MEDICINE

## 2023-11-27 NOTE — PROGRESS NOTES
Subjective   Patient ID: Marilin Khan is a 89 y.o. female who presents for Hypertension.    HPI doing well with her right shoulder with the physical therapy   Has severe oa and avascular necrosis   PATIENT INDICATED COMFORT AND NO REFERRALS FOR SURGERY     Review of SystemsNO INTERVAL HISTORICAL CHANGES IN ANY OF THE 12 SYSTEMS REVIEWED OTHER THAN WITH HER ORTHOPEDIC SYSTEM DISCUSSED     Objective   /66   Pulse 91   Temp 36.1 °C (97 °F)   Wt 57.6 kg (127 lb)   SpO2 98%   BMI 27.48 kg/m²     Physical ExamSINCE RECENT VISIT NO INTERVAL PHYSICAL CHANGES IN ANY OF THE 12 SYSTEMS REVIEWED OTHER THAN SHE CAN NOW ABDUCT AT THE SHOULDER AND RAISE THE ARM ALMOST OVER HER HEAD WITHOUT PAIN AND SHE IS PLEASED     Assessment/Plan   Diagnoses and all orders for this visit:  Primary hypertension  Primary osteoarthritis of right shoulder

## 2023-11-30 RX ORDER — KETOCONAZOLE 20 MG/ML
SHAMPOO, SUSPENSION TOPICAL 2 TIMES WEEKLY
Qty: 120 ML | Refills: 0 | Status: SHIPPED | OUTPATIENT
Start: 2023-11-30 | End: 2024-06-03 | Stop reason: WASHOUT

## 2023-12-05 ENCOUNTER — HOSPITAL ENCOUNTER (OUTPATIENT)
Dept: CARDIOLOGY | Facility: HOSPITAL | Age: 88
Discharge: HOME | End: 2023-12-05
Payer: MEDICARE

## 2023-12-05 PROCEDURE — 93005 ELECTROCARDIOGRAM TRACING: CPT

## 2023-12-06 LAB
ATRIAL RATE: 76 BPM
P AXIS: 35 DEGREES
P OFFSET: 180 MS
P ONSET: 132 MS
PR INTERVAL: 160 MS
Q ONSET: 212 MS
QRS COUNT: 13 BEATS
QRS DURATION: 84 MS
QT INTERVAL: 372 MS
QTC CALCULATION(BAZETT): 418 MS
QTC FREDERICIA: 402 MS
R AXIS: -58 DEGREES
T AXIS: 43 DEGREES
T OFFSET: 398 MS
VENTRICULAR RATE: 76 BPM

## 2023-12-19 NOTE — PATIENT INSTRUCTIONS
From: Hawk Villalba  To: Amos Felty  Sent: 12/18/2023 3:39 PM CST  Subject: lisdexamfetamine- not available     Hi Dr. Agustin Myers,    My pharmacy called me lisdexamfetamine 30 MG Caps is completely out of stock and on back order till further notice in surrounding area. The non generic option is $300+  Any other suggestions? SEE IN ONE MONTH ABOUT THE LOWER BLOOD PRESSURE AND THE CHANGE IN THE MEDICATION   LABS ORDERED /WE WILL CALL     REFERRAL TO [facility] FOR PHYSICAL THERAPY

## 2024-06-03 ENCOUNTER — OFFICE VISIT (OUTPATIENT)
Dept: PRIMARY CARE | Facility: CLINIC | Age: 89
End: 2024-06-03
Payer: MEDICARE

## 2024-06-03 VITALS
TEMPERATURE: 97.5 F | HEIGHT: 58 IN | DIASTOLIC BLOOD PRESSURE: 56 MMHG | BODY MASS INDEX: 26.87 KG/M2 | HEART RATE: 94 BPM | OXYGEN SATURATION: 95 % | WEIGHT: 128 LBS | SYSTOLIC BLOOD PRESSURE: 104 MMHG

## 2024-06-03 DIAGNOSIS — R06.02 CHRONIC SHORTNESS OF BREATH: ICD-10-CM

## 2024-06-03 DIAGNOSIS — M81.0 AGE-RELATED OSTEOPOROSIS WITHOUT CURRENT PATHOLOGICAL FRACTURE: ICD-10-CM

## 2024-06-03 DIAGNOSIS — Z00.00 ROUTINE GENERAL MEDICAL EXAMINATION AT HEALTH CARE FACILITY: Primary | ICD-10-CM

## 2024-06-03 PROCEDURE — 3074F SYST BP LT 130 MM HG: CPT | Performed by: FAMILY MEDICINE

## 2024-06-03 PROCEDURE — 1036F TOBACCO NON-USER: CPT | Performed by: FAMILY MEDICINE

## 2024-06-03 PROCEDURE — G0439 PPPS, SUBSEQ VISIT: HCPCS | Performed by: FAMILY MEDICINE

## 2024-06-03 PROCEDURE — 1159F MED LIST DOCD IN RCRD: CPT | Performed by: FAMILY MEDICINE

## 2024-06-03 PROCEDURE — 3078F DIAST BP <80 MM HG: CPT | Performed by: FAMILY MEDICINE

## 2024-06-03 PROCEDURE — 1170F FXNL STATUS ASSESSED: CPT | Performed by: FAMILY MEDICINE

## 2024-06-03 RX ORDER — ALBUTEROL SULFATE 90 UG/1
2 AEROSOL, METERED RESPIRATORY (INHALATION) EVERY 4 HOURS PRN
Qty: 18 G | Refills: 3 | Status: SHIPPED | OUTPATIENT
Start: 2024-06-03

## 2024-06-03 RX ORDER — DENOSUMAB 60 MG/ML
60 INJECTION SUBCUTANEOUS ONCE
Qty: 1 ML | Refills: 0 | Status: SHIPPED | OUTPATIENT
Start: 2024-06-03 | End: 2024-06-03

## 2024-06-03 RX ORDER — FLUTICASONE PROPIONATE AND SALMETEROL 250; 50 UG/1; UG/1
POWDER RESPIRATORY (INHALATION)
COMMUNITY
Start: 2024-01-23

## 2024-06-03 ASSESSMENT — ACTIVITIES OF DAILY LIVING (ADL)
GROCERY_SHOPPING: TOTAL CARE
BATHING: INDEPENDENT
DOING_HOUSEWORK: TOTAL CARE
MANAGING_FINANCES: INDEPENDENT
TAKING_MEDICATION: TOTAL CARE
DRESSING: INDEPENDENT

## 2024-06-03 ASSESSMENT — ENCOUNTER SYMPTOMS
EYE DISCHARGE: 0
OCCASIONAL FEELINGS OF UNSTEADINESS: 1
LOSS OF SENSATION IN FEET: 0
FLANK PAIN: 0
JOINT SWELLING: 0
CONSTIPATION: 0
HEADACHES: 0
SINUS PRESSURE: 0
COUGH: 0
UNEXPECTED WEIGHT CHANGE: 0
BLOOD IN STOOL: 0
VOMITING: 0
DIZZINESS: 0
PALPITATIONS: 0
WEAKNESS: 0
APPETITE CHANGE: 0
DYSURIA: 0
ACTIVITY CHANGE: 0
NUMBNESS: 0
WHEEZING: 0
ARTHRALGIAS: 0
SHORTNESS OF BREATH: 0
ABDOMINAL PAIN: 0
FEVER: 0
SORE THROAT: 0
DIARRHEA: 0
NAUSEA: 0
RHINORRHEA: 0
MYALGIAS: 0
SLEEP DISTURBANCE: 0
DYSPHORIC MOOD: 0
DEPRESSION: 0
EYE ITCHING: 0
HEMATURIA: 0
NERVOUS/ANXIOUS: 0
LIGHT-HEADEDNESS: 0

## 2024-06-03 ASSESSMENT — VISUAL ACUITY
OD_CC: 20/30
OS_CC: 20/30

## 2024-06-03 ASSESSMENT — PATIENT HEALTH QUESTIONNAIRE - PHQ9
2. FEELING DOWN, DEPRESSED OR HOPELESS: NOT AT ALL
1. LITTLE INTEREST OR PLEASURE IN DOING THINGS: NOT AT ALL
SUM OF ALL RESPONSES TO PHQ9 QUESTIONS 1 AND 2: 0

## 2024-06-03 NOTE — PROGRESS NOTES
"Subjective   Patient ID: Marilin Khan is a 89 y.o. female who presents for Medicare Annual Wellness Visit Subsequent.    HPI 89 YO HERE FOR WELLNESS. A FAMILY FRIEND ASKED IF SHE WOULD GO TO GERIATRICIAN IN [facility].  MINI MENTAL IN FRONT OF NIECE IS GOOD. KNOWS PRESIDENT CARON AND WHAT SHE HAD FOR BREAKFAST AND SO ON.       Review of Systems   Constitutional:  Negative for activity change, appetite change, fever and unexpected weight change.   HENT:  Negative for congestion, ear pain, postnasal drip, rhinorrhea, sinus pressure and sore throat.    Eyes:  Negative for discharge, itching and visual disturbance.   Respiratory:  Negative for cough, shortness of breath and wheezing.    Cardiovascular:  Negative for chest pain, palpitations and leg swelling.   Gastrointestinal:  Negative for abdominal pain, blood in stool, constipation, diarrhea, nausea and vomiting.   Endocrine: Negative for cold intolerance, heat intolerance and polyuria.   Genitourinary:  Negative for dysuria, flank pain and hematuria.   Musculoskeletal:  Negative for arthralgias, gait problem, joint swelling and myalgias.        HAS OSTEOPOROSIS    Skin:  Negative for rash.   Allergic/Immunologic: Negative for environmental allergies and food allergies.   Neurological:  Negative for dizziness, syncope, weakness, light-headedness, numbness and headaches.   Hematological:         HAS PERSISTENT LEUCOCYTOSIS WITH MOSTLY LYMPHOCYTES /A LEUKEMIA   DISCUSSED BLOOD WORK AND THIS IS DECLINED TODAY    Psychiatric/Behavioral:  Negative for dysphoric mood and sleep disturbance. The patient is not nervous/anxious.         MINOR FORGETFULNESS        Objective   /56   Pulse 94   Temp 36.4 °C (97.5 °F)   Ht 1.461 m (4' 9.5\")   Wt 58.1 kg (128 lb)   SpO2 95%   BMI 27.22 kg/m²     Physical Exam  Vitals and nursing note reviewed.   Constitutional:       Appearance: Normal appearance.   HENT:      Head: Normocephalic.   Cardiovascular:      Rate and " Rhythm: Normal rate and regular rhythm.      Pulses: Normal pulses.      Heart sounds: Normal heart sounds. No murmur heard.     No friction rub. No gallop.   Pulmonary:      Effort: Pulmonary effort is normal. No respiratory distress.      Breath sounds: Normal breath sounds. No wheezing.   Abdominal:      General: There is no distension.      Palpations: Abdomen is soft.      Tenderness: There is no abdominal tenderness.   Musculoskeletal:         General: No deformity. Normal range of motion.   Lymphadenopathy:      Cervical: No cervical adenopathy.   Skin:     General: Skin is warm and dry.      Capillary Refill: Capillary refill takes less than 2 seconds.   Neurological:      General: No focal deficit present.      Mental Status: She is alert and oriented to person, place, and time.      Comments: SLIGHT MENTAL CHANGES AGREED UPON BY NIECE, PATIENT AND I BUT NO NECESSARY TO GO TO [facility] AND OR TAKE MORE MEDICATION   AGREED TO LABS NEXT VISIT    Psychiatric:         Mood and Affect: Mood normal.         Assessment/Plan   Problem List Items Addressed This Visit             ICD-10-CM    Chronic shortness of breath R06.02    Relevant Medications    albuterol 90 mcg/actuation inhaler    Osteoporosis M81.0    Relevant Medications    denosumab (Prolia) 60 mg/mL syringe    Routine general medical examination at health care facility - Primary Z00.00

## 2024-06-03 NOTE — PATIENT INSTRUCTIONS
GET THE VACCINES IN THE FLU AND BOOSTER PNEUMONIA THAT IS CALLED PREVNAR 20  ORDERED THE OSTEOPOROSIS SHOT   SEE HERE IN THE FALL FOR LABS

## 2024-06-18 DIAGNOSIS — G89.29 OTHER CHRONIC PAIN: ICD-10-CM

## 2024-06-18 DIAGNOSIS — I95.1 ORTHOSTATIC HYPOTENSION: ICD-10-CM

## 2024-10-08 ENCOUNTER — APPOINTMENT (OUTPATIENT)
Dept: PRIMARY CARE | Facility: CLINIC | Age: 89
End: 2024-10-08
Payer: MEDICARE

## 2024-10-08 VITALS
BODY MASS INDEX: 28.28 KG/M2 | OXYGEN SATURATION: 93 % | HEART RATE: 109 BPM | DIASTOLIC BLOOD PRESSURE: 70 MMHG | TEMPERATURE: 97 F | SYSTOLIC BLOOD PRESSURE: 128 MMHG | WEIGHT: 133 LBS

## 2024-10-08 DIAGNOSIS — S51.012A SKIN TEAR OF LEFT ELBOW WITHOUT COMPLICATION, INITIAL ENCOUNTER: Primary | ICD-10-CM

## 2024-10-08 DIAGNOSIS — Z23 FLU VACCINE NEED: ICD-10-CM

## 2024-10-08 PROCEDURE — 99214 OFFICE O/P EST MOD 30 MIN: CPT | Performed by: FAMILY MEDICINE

## 2024-10-08 PROCEDURE — 3078F DIAST BP <80 MM HG: CPT | Performed by: FAMILY MEDICINE

## 2024-10-08 PROCEDURE — 1159F MED LIST DOCD IN RCRD: CPT | Performed by: FAMILY MEDICINE

## 2024-10-08 PROCEDURE — 90662 IIV NO PRSV INCREASED AG IM: CPT | Performed by: FAMILY MEDICINE

## 2024-10-08 PROCEDURE — G0008 ADMIN INFLUENZA VIRUS VAC: HCPCS | Performed by: FAMILY MEDICINE

## 2024-10-08 PROCEDURE — 3074F SYST BP LT 130 MM HG: CPT | Performed by: FAMILY MEDICINE

## 2024-10-08 NOTE — PROGRESS NOTES
Subjective   Patient ID: Marilin Khan is a 89 y.o. female who presents for skin tear (L arm).    HPI BROUGHT BY A CARE GIVEN   SHE IS ALERT BUT CONFUSED   CAREGIVER GENTLY REDIRECTS HER   HAS HEARING LOSS     Review of Systems   Constitutional:  Negative for activity change, appetite change, fever and unexpected weight change.   HENT:  Positive for hearing loss. Negative for congestion, ear pain, postnasal drip, rhinorrhea, sinus pressure and sore throat.    Eyes:  Negative for discharge, itching and visual disturbance.   Respiratory:  Negative for cough, shortness of breath and wheezing.    Cardiovascular:  Negative for chest pain, palpitations and leg swelling.   Gastrointestinal:  Negative for abdominal pain, blood in stool, constipation, diarrhea, nausea and vomiting.   Endocrine: Negative for cold intolerance, heat intolerance and polyuria.   Genitourinary:  Negative for dysuria, flank pain and hematuria.   Musculoskeletal:  Negative for arthralgias, gait problem, joint swelling and myalgias.   Skin:  Positive for wound. Negative for rash.        SKIN TEAR AT LEFT FOREARM/ELBOW AREA  NO FALL JUST A BRUSH UP AGAINST A WALL    Allergic/Immunologic: Negative for environmental allergies and food allergies.   Neurological:  Negative for dizziness, syncope, weakness, light-headedness, numbness and headaches.   Psychiatric/Behavioral:  Positive for confusion. Negative for dysphoric mood and sleep disturbance. The patient is not nervous/anxious.        Objective   /70   Pulse 109   Temp 36.1 °C (97 °F)   Wt 60.3 kg (133 lb)   SpO2 93%   BMI 28.28 kg/m²     Physical Exam  Vitals and nursing note reviewed.   Constitutional:       Appearance: Normal appearance.   HENT:      Head: Normocephalic.   Cardiovascular:      Rate and Rhythm: Normal rate and regular rhythm.      Pulses: Normal pulses.      Heart sounds: Normal heart sounds. No murmur heard.     No friction rub. No gallop.   Pulmonary:      Effort:  Pulmonary effort is normal. No respiratory distress.      Breath sounds: Normal breath sounds. No wheezing.   Abdominal:      General: Bowel sounds are normal. There is no distension.      Palpations: Abdomen is soft.      Tenderness: There is no abdominal tenderness.   Musculoskeletal:         General: No deformity. Normal range of motion.   Skin:     General: Skin is warm and dry.      Capillary Refill: Capillary refill takes less than 2 seconds.   Neurological:      General: No focal deficit present.      Mental Status: She is alert and oriented to person, place, and time.   Psychiatric:         Mood and Affect: Mood normal.         Assessment/Plan   Problem List Items Addressed This Visit             ICD-10-CM    Flu vaccine need Z23    Relevant Orders    Flu vaccine, trivalent, preservative free, HIGH-DOSE, age 65y+ (Fluzone) (Completed)    Skin tear of left elbow without complication - Primary S51.012A

## 2024-10-10 ASSESSMENT — ENCOUNTER SYMPTOMS
UNEXPECTED WEIGHT CHANGE: 0
WEAKNESS: 0
WHEEZING: 0
FEVER: 0
ARTHRALGIAS: 0
SLEEP DISTURBANCE: 0
ABDOMINAL PAIN: 0
SHORTNESS OF BREATH: 0
PALPITATIONS: 0
CONFUSION: 1
NERVOUS/ANXIOUS: 0
COUGH: 0
BLOOD IN STOOL: 0
NAUSEA: 0
DIZZINESS: 0
MYALGIAS: 0
SINUS PRESSURE: 0
LIGHT-HEADEDNESS: 0
EYE DISCHARGE: 0
DYSURIA: 0
SORE THROAT: 0
EYE ITCHING: 0
DYSPHORIC MOOD: 0
JOINT SWELLING: 0
FLANK PAIN: 0
APPETITE CHANGE: 0
WOUND: 1
HEADACHES: 0
NUMBNESS: 0
RHINORRHEA: 0
HEMATURIA: 0
DIARRHEA: 0
VOMITING: 0
CONSTIPATION: 0
ACTIVITY CHANGE: 0

## 2024-10-22 ENCOUNTER — APPOINTMENT (OUTPATIENT)
Dept: PRIMARY CARE | Facility: CLINIC | Age: 89
End: 2024-10-22
Payer: MEDICARE

## 2024-10-22 VITALS
TEMPERATURE: 97.1 F | WEIGHT: 132 LBS | HEART RATE: 106 BPM | OXYGEN SATURATION: 96 % | BODY MASS INDEX: 28.07 KG/M2 | DIASTOLIC BLOOD PRESSURE: 72 MMHG | SYSTOLIC BLOOD PRESSURE: 102 MMHG

## 2024-10-22 DIAGNOSIS — D72.820 LYMPHOCYTOSIS: ICD-10-CM

## 2024-10-22 DIAGNOSIS — N30.00 ACUTE CYSTITIS WITHOUT HEMATURIA: Primary | ICD-10-CM

## 2024-10-22 LAB
POC APPEARANCE, URINE: CLEAR
POC BILIRUBIN, URINE: NEGATIVE
POC BLOOD, URINE: NEGATIVE
POC COLOR, URINE: YELLOW
POC GLUCOSE, URINE: NEGATIVE MG/DL
POC KETONES, URINE: NEGATIVE MG/DL
POC LEUKOCYTES, URINE: NEGATIVE
POC NITRITE,URINE: NEGATIVE
POC PH, URINE: 7 PH
POC PROTEIN, URINE: NEGATIVE MG/DL
POC SPECIFIC GRAVITY, URINE: 1.02
POC UROBILINOGEN, URINE: 0.2 EU/DL

## 2024-10-22 PROCEDURE — 3078F DIAST BP <80 MM HG: CPT | Performed by: FAMILY MEDICINE

## 2024-10-22 PROCEDURE — 81003 URINALYSIS AUTO W/O SCOPE: CPT | Performed by: FAMILY MEDICINE

## 2024-10-22 PROCEDURE — 3074F SYST BP LT 130 MM HG: CPT | Performed by: FAMILY MEDICINE

## 2024-10-22 PROCEDURE — 99214 OFFICE O/P EST MOD 30 MIN: CPT | Performed by: FAMILY MEDICINE

## 2024-10-22 PROCEDURE — 1159F MED LIST DOCD IN RCRD: CPT | Performed by: FAMILY MEDICINE

## 2024-10-22 RX ORDER — GUAIFENESIN 600 MG/1
1200 TABLET, EXTENDED RELEASE ORAL 2 TIMES DAILY
COMMUNITY

## 2024-10-22 RX ORDER — FLUTICASONE FUROATE AND VILANTEROL TRIFENATATE 100; 25 UG/1; UG/1
POWDER RESPIRATORY (INHALATION)
COMMUNITY
Start: 2024-10-16

## 2024-10-22 NOTE — PROGRESS NOTES
Subjective   Patient ID: Marilin Khan is a 89 y.o. female who presents for Hospital Follow-up ([facility]- fatigued- Covid +, EKG, lab, urine-UTI+.  Still very tired.).    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 IN 10-23 I REFERRED HER TO DR MURRAY, HEMATOLOGY ONCOLOGY THAT NEVER HAPPENED   IN 2019 HER LABS SHOWED THE LEUCOCYTOSIS AND MOSTLY LYMPHOCYTES   SHE IS DOING PHYSICAL THERAPY FOR AVASCULAR  NECROSIS OF HER RIGHT SHOULDER  SHE HAS OSTEOPOROSIS       Review of Systems   Constitutional:  Negative for activity change, appetite change, fever and unexpected weight change.   HENT:  Negative for congestion, ear pain, postnasal drip, rhinorrhea, sinus pressure and sore throat.    Eyes:  Negative for discharge, itching and visual disturbance.   Respiratory:  Negative for cough, shortness of breath and wheezing.    Cardiovascular:  Negative for chest pain, palpitations and leg swelling.   Gastrointestinal:  Negative for abdominal pain, blood in stool, constipation, diarrhea, nausea and vomiting.   Endocrine: Negative for cold intolerance, heat intolerance and polyuria.   Genitourinary:  Negative for dysuria, flank pain and hematuria.        UA I CLEAR TODAY AND THE BLADDER INFECTION IS RESOLVED   APPARENTLY NOT SEPTIC   NO BLOOD C&S WAS DONE   OR UA C&S    Musculoskeletal:  Positive for arthralgias. Negative for gait problem, joint swelling and myalgias.        RIGHT SHOULDER    Skin:  Negative for rash.   Allergic/Immunologic: Negative for environmental allergies and food allergies.   Neurological:  Negative for dizziness, syncope, weakness, light-headedness, numbness and headaches.   Psychiatric/Behavioral:  Positive for confusion and decreased concentration. Negative for dysphoric mood and sleep disturbance. The patient is not nervous/anxious.        Objective   /72    Pulse 106   Temp 36.2 °C (97.1 °F)   Wt 59.9 kg (132 lb)   SpO2 96%   BMI 28.07 kg/m²     Physical Exam  Vitals and nursing note reviewed.   Constitutional:       Appearance: Normal appearance.   HENT:      Head: Normocephalic.   Cardiovascular:      Rate and Rhythm: Normal rate and regular rhythm.      Pulses: Normal pulses.      Heart sounds: Normal heart sounds. No murmur heard.     No friction rub. No gallop.   Pulmonary:      Effort: Pulmonary effort is normal. No respiratory distress.      Breath sounds: Normal breath sounds. No wheezing.   Abdominal:      General: Abdomen is flat. Bowel sounds are normal. There is no distension.      Palpations: Abdomen is soft.      Tenderness: There is no abdominal tenderness. There is no right CVA tenderness or left CVA tenderness.   Genitourinary:     Comments: URINE CLEAR NOW   Musculoskeletal:         General: Tenderness and deformity present. Normal range of motion.      Comments: DECREASE ROM OF RIGHT SHOULDER    Skin:     General: Skin is warm and dry.      Capillary Refill: Capillary refill takes less than 2 seconds.   Neurological:      General: No focal deficit present.      Mental Status: She is alert and oriented to person, place, and time.   Psychiatric:         Mood and Affect: Mood normal.         Assessment/Plan   Problem List Items Addressed This Visit             ICD-10-CM    Acute cystitis without hematuria - Primary N30.00    Relevant Orders    POCT UA Automated manually resulted (Completed)    Lymphocytosis D72.820

## 2024-10-24 PROBLEM — N30.00 ACUTE CYSTITIS WITHOUT HEMATURIA: Status: ACTIVE | Noted: 2024-10-24

## 2024-10-24 PROBLEM — D72.820 LYMPHOCYTOSIS: Status: ACTIVE | Noted: 2024-10-24

## 2024-10-24 ASSESSMENT — ENCOUNTER SYMPTOMS
SORE THROAT: 0
WHEEZING: 0
DECREASED CONCENTRATION: 1
SLEEP DISTURBANCE: 0
CONSTIPATION: 0
RHINORRHEA: 0
FLANK PAIN: 0
COUGH: 0
ABDOMINAL PAIN: 0
NERVOUS/ANXIOUS: 0
NAUSEA: 0
SINUS PRESSURE: 0
WEAKNESS: 0
DYSPHORIC MOOD: 0
HEMATURIA: 0
DYSURIA: 0
FEVER: 0
APPETITE CHANGE: 0
NUMBNESS: 0
EYE ITCHING: 0
CONFUSION: 1
LIGHT-HEADEDNESS: 0
EYE DISCHARGE: 0
UNEXPECTED WEIGHT CHANGE: 0
BLOOD IN STOOL: 0
DIZZINESS: 0
ACTIVITY CHANGE: 0
ARTHRALGIAS: 1
SHORTNESS OF BREATH: 0
MYALGIAS: 0
DIARRHEA: 0
PALPITATIONS: 0
JOINT SWELLING: 0
VOMITING: 0
HEADACHES: 0

## 2024-11-19 ENCOUNTER — APPOINTMENT (OUTPATIENT)
Dept: PRIMARY CARE | Facility: CLINIC | Age: 89
End: 2024-11-19
Payer: MEDICARE

## 2024-12-04 DIAGNOSIS — I10 PRIMARY HYPERTENSION: ICD-10-CM

## 2024-12-04 DIAGNOSIS — I95.1 ORTHOSTATIC HYPOTENSION: ICD-10-CM

## 2024-12-04 RX ORDER — AMLODIPINE BESYLATE 2.5 MG/1
2.5 TABLET ORAL DAILY
Qty: 90 TABLET | Refills: 0 | Status: SHIPPED | OUTPATIENT
Start: 2024-12-04

## 2025-02-21 ENCOUNTER — TELEPHONE (OUTPATIENT)
Dept: PRIMARY CARE | Facility: CLINIC | Age: OVER 89
End: 2025-02-21
Payer: MEDICARE

## 2025-02-21 NOTE — TELEPHONE ENCOUNTER
Patient left voicemail stating her oxygen company is needing a note from you stating that she needs to continue with her oxygen in order for them to supply.  Is this something we can do or would need to be seen first for an appointment?

## 2025-03-03 ENCOUNTER — APPOINTMENT (OUTPATIENT)
Dept: PRIMARY CARE | Facility: CLINIC | Age: OVER 89
End: 2025-03-03
Payer: MEDICARE

## 2025-03-03 VITALS
DIASTOLIC BLOOD PRESSURE: 76 MMHG | TEMPERATURE: 98.6 F | WEIGHT: 135.6 LBS | BODY MASS INDEX: 28.84 KG/M2 | OXYGEN SATURATION: 92 % | HEART RATE: 66 BPM | SYSTOLIC BLOOD PRESSURE: 130 MMHG

## 2025-03-03 DIAGNOSIS — J43.2 CENTRILOBULAR EMPHYSEMA (MULTI): ICD-10-CM

## 2025-03-03 DIAGNOSIS — I95.1 ORTHOSTATIC HYPOTENSION: ICD-10-CM

## 2025-03-03 DIAGNOSIS — I10 PRIMARY HYPERTENSION: ICD-10-CM

## 2025-03-03 DIAGNOSIS — R06.02 CHRONIC SHORTNESS OF BREATH: Primary | ICD-10-CM

## 2025-03-03 PROCEDURE — 1159F MED LIST DOCD IN RCRD: CPT | Performed by: FAMILY MEDICINE

## 2025-03-03 PROCEDURE — 3075F SYST BP GE 130 - 139MM HG: CPT | Performed by: FAMILY MEDICINE

## 2025-03-03 PROCEDURE — 3078F DIAST BP <80 MM HG: CPT | Performed by: FAMILY MEDICINE

## 2025-03-03 PROCEDURE — 99214 OFFICE O/P EST MOD 30 MIN: CPT | Performed by: FAMILY MEDICINE

## 2025-03-03 PROCEDURE — 1036F TOBACCO NON-USER: CPT | Performed by: FAMILY MEDICINE

## 2025-03-03 RX ORDER — AMLODIPINE BESYLATE 2.5 MG/1
2.5 TABLET ORAL DAILY
Qty: 90 TABLET | Refills: 3 | Status: SHIPPED | OUTPATIENT
Start: 2025-03-03

## 2025-03-03 ASSESSMENT — ENCOUNTER SYMPTOMS
MYALGIAS: 0
UNEXPECTED WEIGHT CHANGE: 0
APPETITE CHANGE: 1
NERVOUS/ANXIOUS: 0
DYSURIA: 0
SHORTNESS OF BREATH: 1
EYE ITCHING: 0
DYSPHORIC MOOD: 0
EYE DISCHARGE: 0
CONSTIPATION: 0
SLEEP DISTURBANCE: 0
FEVER: 0
ACTIVITY CHANGE: 1
NUMBNESS: 0
VOMITING: 0
PALPITATIONS: 0
WHEEZING: 0
NAUSEA: 0
DIARRHEA: 0
SINUS PRESSURE: 0
COUGH: 1
ARTHRALGIAS: 1
ABDOMINAL PAIN: 0
HEMATURIA: 0
JOINT SWELLING: 0
WEAKNESS: 0
SORE THROAT: 0
RHINORRHEA: 0
LIGHT-HEADEDNESS: 0
FLANK PAIN: 0
DIZZINESS: 0
BLOOD IN STOOL: 0
HEADACHES: 0

## 2025-03-03 NOTE — PROGRESS NOTES
Subjective   Patient ID: Marilin Khan is a 89 y.o. female who presents for Follow-up (FUV CONTINUE O2 AT NIGHT. ).    HPI USES 02 ONLY AT NIGHT   2L/MIN   CONCENTRATOR   ACS   THIS IS A FACE TO FACE TO CONTINUE THE CURRENT 02 USINS ONLY AT NIGHT AND AT THE 2L   HER EXERTIONAL 02 HERE IS 92%     10-24 WENT TO [facility] /COPD  ON CHEST X-RAY AND SEVERE DDD LUMBAR   RIGHT AVASUCLAR NECROSIS RIGHT HUMERUS IS KNOWN (USES CANE )     Review of Systems   Constitutional:  Positive for activity change and appetite change. Negative for fever and unexpected weight change.        CAN NOT DRIVE ANY LONGER   NEPHEW BROUGHT HER TODAY, FORTUNATO    RAMONT:  Negative for congestion, ear pain, postnasal drip, rhinorrhea, sinus pressure and sore throat.    Eyes:  Negative for discharge, itching and visual disturbance.   Respiratory:  Positive for cough and shortness of breath. Negative for wheezing.         COPD    Cardiovascular:  Negative for chest pain, palpitations and leg swelling.   Gastrointestinal:  Negative for abdominal pain, blood in stool, constipation, diarrhea, nausea and vomiting.   Endocrine: Negative for cold intolerance, heat intolerance and polyuria.   Genitourinary:  Negative for dysuria, flank pain and hematuria.   Musculoskeletal:  Positive for arthralgias. Negative for gait problem, joint swelling and myalgias.        AVASCULAR NECROSIS RIGHT HUMERUS    Skin:  Negative for rash.   Allergic/Immunologic: Negative for environmental allergies and food allergies.   Neurological:  Positive for syncope. Negative for dizziness, weakness, light-headedness, numbness and headaches.        DID PASS OUT ONCE SHE SAYS    Psychiatric/Behavioral:  Negative for dysphoric mood and sleep disturbance. The patient is not nervous/anxious.        Objective   /76   Pulse 66   Temp 37 °C (98.6 °F)   Wt 61.5 kg (135 lb 9.6 oz)   SpO2 92%   BMI 28.84 kg/m²     Physical Exam  Vitals and nursing note reviewed.   Constitutional:        Appearance: Normal appearance.   HENT:      Head: Normocephalic.   Cardiovascular:      Rate and Rhythm: Normal rate and regular rhythm.      Pulses: Normal pulses.      Heart sounds: Normal heart sounds. No murmur heard.     No friction rub. No gallop.   Pulmonary:      Effort: Pulmonary effort is normal. No respiratory distress.      Breath sounds: Normal breath sounds. No wheezing.   Abdominal:      General: There is no distension.      Palpations: Abdomen is soft.      Tenderness: There is no abdominal tenderness.   Musculoskeletal:         General: Tenderness and deformity present.      Comments: RIGHT SHOULDER    Skin:     General: Skin is warm and dry.      Capillary Refill: Capillary refill takes less than 2 seconds.   Neurological:      General: No focal deficit present.      Mental Status: She is alert and oriented to person, place, and time.   Psychiatric:         Mood and Affect: Mood normal.         Assessment/Plan   Problem List Items Addressed This Visit             ICD-10-CM    Chronic shortness of breath - Primary R06.02    Hypertension I10    Relevant Medications    amLODIPine (Norvasc) 2.5 mg tablet    Orthostatic hypotension I95.1    Relevant Medications    amLODIPine (Norvasc) 2.5 mg tablet    Centrilobular emphysema (Multi) J43.2

## 2025-03-05 ENCOUNTER — TELEPHONE (OUTPATIENT)
Dept: PRIMARY CARE | Facility: CLINIC | Age: OVER 89
End: 2025-03-05
Payer: MEDICARE

## 2025-03-05 DIAGNOSIS — J30.1 SEASONAL ALLERGIC RHINITIS DUE TO POLLEN: Primary | ICD-10-CM

## 2025-03-05 RX ORDER — FLUTICASONE PROPIONATE AND SALMETEROL 250; 50 UG/1; UG/1
1 POWDER RESPIRATORY (INHALATION)
Qty: 60 EACH | Refills: 3 | Status: SHIPPED | OUTPATIENT
Start: 2025-03-05

## 2025-03-05 NOTE — TELEPHONE ENCOUNTER
Requesting refill for fluticasone propion-salmeteroL (Advair Diskus) 250-50 mcg/dose diskus inhaler  She confirmed [facility]

## 2025-05-20 ENCOUNTER — OFFICE VISIT (OUTPATIENT)
Dept: PRIMARY CARE | Facility: CLINIC | Age: OVER 89
End: 2025-05-20
Payer: MEDICARE

## 2025-05-20 VITALS
WEIGHT: 137.2 LBS | DIASTOLIC BLOOD PRESSURE: 70 MMHG | TEMPERATURE: 97.5 F | HEART RATE: 93 BPM | BODY MASS INDEX: 29.18 KG/M2 | OXYGEN SATURATION: 91 % | SYSTOLIC BLOOD PRESSURE: 142 MMHG

## 2025-05-20 DIAGNOSIS — L08.9 INFECTED SKIN TEAR: Primary | ICD-10-CM

## 2025-05-20 DIAGNOSIS — E44.0 MODERATE PROTEIN-CALORIE MALNUTRITION (MULTI): ICD-10-CM

## 2025-05-20 DIAGNOSIS — T14.8XXA INFECTED SKIN TEAR: Primary | ICD-10-CM

## 2025-05-20 PROCEDURE — 99213 OFFICE O/P EST LOW 20 MIN: CPT | Performed by: FAMILY MEDICINE

## 2025-05-20 PROCEDURE — 3078F DIAST BP <80 MM HG: CPT | Performed by: FAMILY MEDICINE

## 2025-05-20 PROCEDURE — 1157F ADVNC CARE PLAN IN RCRD: CPT | Performed by: FAMILY MEDICINE

## 2025-05-20 PROCEDURE — 1036F TOBACCO NON-USER: CPT | Performed by: FAMILY MEDICINE

## 2025-05-20 PROCEDURE — 3077F SYST BP >= 140 MM HG: CPT | Performed by: FAMILY MEDICINE

## 2025-05-20 PROCEDURE — 1159F MED LIST DOCD IN RCRD: CPT | Performed by: FAMILY MEDICINE

## 2025-05-20 RX ORDER — DOXYCYCLINE 100 MG/1
100 CAPSULE ORAL 2 TIMES DAILY
Qty: 14 CAPSULE | Refills: 0 | Status: SHIPPED | OUTPATIENT
Start: 2025-05-20 | End: 2025-05-27

## 2025-05-20 ASSESSMENT — PATIENT HEALTH QUESTIONNAIRE - PHQ9
SUM OF ALL RESPONSES TO PHQ9 QUESTIONS 1 AND 2: 1
10. IF YOU CHECKED OFF ANY PROBLEMS, HOW DIFFICULT HAVE THESE PROBLEMS MADE IT FOR YOU TO DO YOUR WORK, TAKE CARE OF THINGS AT HOME, OR GET ALONG WITH OTHER PEOPLE: SOMEWHAT DIFFICULT
1. LITTLE INTEREST OR PLEASURE IN DOING THINGS: NOT AT ALL
2. FEELING DOWN, DEPRESSED OR HOPELESS: SEVERAL DAYS

## 2025-05-20 NOTE — PATIENT INSTRUCTIONS
SENT DOXYCYCLINE FOR INFECTION   RE-DRESSED THE WOUNDS  PERHAPS GET PAPER TAPE TO PREVENT FURTHER TEARING   MOIST HEAT TO THE LEG TO HELP THE DEEPER BRUISING

## 2025-05-20 NOTE — PROGRESS NOTES
"Subjective   Patient ID: Marilin Khan is a 89 y.o. female who presents for Wound Check (LEFT LEG BLACK AND BLUE/WOUND MARKS/LEFT HAND TOO).  HPITHE LOWER LEG ONES WERE ON HER WHEELCHAIR LAST WEEK  THE HAND JUST TODAY OR YESTERDAY UNKNOWN HOW IT HAPPENED     Review of SystemsSINCE THE  VISIT NO INTERVAL HISTORICAL CHANGES IN ANY OF THE 12 SYSTEMS REVIEWED OTHER THAN OTHER THAN RECENT SKIN TEARS         11/8/2023     5:14 PM 11/27/2023    10:45 AM 6/3/2024    10:26 AM 10/8/2024    10:21 AM 10/22/2024     9:18 AM 3/3/2025    11:45 AM 5/20/2025    11:39 AM   Vitals   Systolic 136 102 104 128 102 130 142   Diastolic 79 66 56 70 72 76 70   Heart Rate 84 91 94 109 106 66 93   Temp  36.1 °C (97 °F) 36.4 °C (97.5 °F) 36.1 °C (97 °F) 36.2 °C (97.1 °F) 37 °C (98.6 °F) 36.4 °C (97.5 °F)   Resp 18         Height   1.461 m (4' 9.5\")       Weight (lb)  127 128 133 132 135.6 137.2   BMI  27.48 kg/m2 27.22 kg/m2 28.28 kg/m2 28.07 kg/m2 28.84 kg/m2 29.18 kg/m2   BSA (m2)  1.52 m2 1.54 m2 1.56 m2 1.56 m2 1.58 m2 1.59 m2   Visit Report  Report Report Report Report Report Report       Body mass index is 29.18 kg/m².      Objective   Physical ExamSINCE RECENT VISIT NO INTERVAL PHYSICAL CHANGES IN ANY OF THE 12 SYSTEMS REVIEWED OTHER THAN OTHER THAN DEEPER LEFT LEG BRUISE AND SKIN TEARS BOTH LEG AND HAND   SKIN IS VERY FRAGILE     Assessment/Plan   Problem List Items Addressed This Visit       Infected skin tear - Primary    Relevant Medications    doxycycline (Vibramycin) 100 mg capsule    Moderate protein-calorie malnutrition (Multi)          Estefania Black, DO     "

## 2025-06-06 DIAGNOSIS — R41.0 CONFUSION: ICD-10-CM

## 2025-06-06 DIAGNOSIS — M54.50 LOW BACK PAIN WITHOUT SCIATICA, UNSPECIFIED BACK PAIN LATERALITY, UNSPECIFIED CHRONICITY: ICD-10-CM

## 2025-06-06 DIAGNOSIS — R30.0 DYSURIA: Primary | ICD-10-CM

## 2025-06-06 RX ORDER — NITROFURANTOIN 25; 75 MG/1; MG/1
100 CAPSULE ORAL 2 TIMES DAILY
Qty: 10 CAPSULE | Refills: 0 | Status: SHIPPED | OUTPATIENT
Start: 2025-06-06 | End: 2025-06-11

## 2025-06-06 NOTE — PROGRESS NOTES
Pt daughter called requesting a UA due to back pain and confusion.  I put an order in for UA and cult.  Would you like to treat?

## 2025-06-08 LAB
APPEARANCE UR: ABNORMAL
BACTERIA #/AREA URNS HPF: ABNORMAL /HPF
BACTERIA UR CULT: NORMAL
BILIRUB UR QL STRIP: NEGATIVE
CAOX CRY #/AREA URNS HPF: ABNORMAL /HPF
COLOR UR: YELLOW
GLUCOSE UR QL STRIP: NEGATIVE
HGB UR QL STRIP: NEGATIVE
HYALINE CASTS #/AREA URNS LPF: ABNORMAL /LPF
KETONES UR QL STRIP: NEGATIVE
LEUKOCYTE ESTERASE UR QL STRIP: ABNORMAL
NITRITE UR QL STRIP: NEGATIVE
PH UR STRIP: 5.5 [PH] (ref 5–8)
PROT UR QL STRIP: ABNORMAL
RBC #/AREA URNS HPF: ABNORMAL /HPF
SERVICE CMNT-IMP: ABNORMAL
SP GR UR STRIP: 1.03 (ref 1–1.03)
SQUAMOUS #/AREA URNS HPF: ABNORMAL /HPF
WBC #/AREA URNS HPF: ABNORMAL /HPF

## 2025-07-16 DIAGNOSIS — J30.1 SEASONAL ALLERGIC RHINITIS DUE TO POLLEN: ICD-10-CM

## 2025-07-16 DIAGNOSIS — R06.02 CHRONIC SHORTNESS OF BREATH: ICD-10-CM

## 2025-07-17 RX ORDER — FLUTICASONE PROPIONATE AND SALMETEROL 250; 50 UG/1; UG/1
1 POWDER RESPIRATORY (INHALATION)
Qty: 60 EACH | Refills: 3 | Status: SHIPPED | OUTPATIENT
Start: 2025-07-17

## 2025-07-17 RX ORDER — ALBUTEROL SULFATE 90 UG/1
2 INHALANT RESPIRATORY (INHALATION) EVERY 4 HOURS PRN
Qty: 18 G | Refills: 3 | Status: SHIPPED | OUTPATIENT
Start: 2025-07-17